# Patient Record
Sex: FEMALE | URBAN - METROPOLITAN AREA
[De-identification: names, ages, dates, MRNs, and addresses within clinical notes are randomized per-mention and may not be internally consistent; named-entity substitution may affect disease eponyms.]

---

## 2023-08-16 ENCOUNTER — OFFICE VISIT (OUTPATIENT)
Dept: URBAN - METROPOLITAN AREA CLINIC 113 | Facility: CLINIC | Age: 38
End: 2023-08-16

## 2023-08-16 NOTE — HPI-TODAY'S VISIT:
38-year-old woman with prior cholecystectomy presents for further evaluation of abdominal pain.  She was seen in the [facility] emergency room for worsening right-sided abdominal/flank pain for a few months in May 2023.  Lab evaluation in the emergency room was unremarkable.  Specifically, lipase and liver enzymes were normal.  CT scan of the abdomen was unremarkable with stable uterine fundal fibroid and trace pelvic free fluid thought to be physiologic.  No GI tract abnormalities.